# Patient Record
Sex: MALE | Employment: UNEMPLOYED | URBAN - METROPOLITAN AREA
[De-identification: names, ages, dates, MRNs, and addresses within clinical notes are randomized per-mention and may not be internally consistent; named-entity substitution may affect disease eponyms.]

---

## 2024-07-30 ENCOUNTER — APPOINTMENT (OUTPATIENT)
Dept: GENERAL RADIOLOGY | Age: 54
End: 2024-07-30
Payer: COMMERCIAL

## 2024-07-30 ENCOUNTER — HOSPITAL ENCOUNTER (EMERGENCY)
Age: 54
Discharge: HOME | End: 2024-07-30
Attending: GENERAL PRACTICE
Payer: COMMERCIAL

## 2024-07-30 VITALS
DIASTOLIC BLOOD PRESSURE: 78 MMHG | BODY MASS INDEX: 29.55 KG/M2 | WEIGHT: 195 LBS | SYSTOLIC BLOOD PRESSURE: 139 MMHG | HEART RATE: 78 BPM | OXYGEN SATURATION: 97 % | HEIGHT: 68 IN | TEMPERATURE: 98.8 F | RESPIRATION RATE: 21 BRPM

## 2024-07-30 DIAGNOSIS — S42.402A CLOSED FRACTURE OF LEFT ELBOW, INITIAL ENCOUNTER: Primary | ICD-10-CM

## 2024-07-30 DIAGNOSIS — S53.105A CLOSED DISLOCATION OF LEFT ELBOW, INITIAL ENCOUNTER: ICD-10-CM

## 2024-07-30 PROCEDURE — 99152 MOD SED SAME PHYS/QHP 5/>YRS: CPT

## 2024-07-30 PROCEDURE — 24675 CLTX ULNAR FX PROX W/MNPJ: CPT

## 2024-07-30 PROCEDURE — 73070 X-RAY EXAM OF ELBOW: CPT

## 2024-07-30 PROCEDURE — 6360000002 HC RX W HCPCS: Performed by: GENERAL PRACTICE

## 2024-07-30 PROCEDURE — 99285 EMERGENCY DEPT VISIT HI MDM: CPT

## 2024-07-30 RX ORDER — HYDROCODONE BITARTRATE AND ACETAMINOPHEN 5; 325 MG/1; MG/1
1 TABLET ORAL EVERY 6 HOURS PRN
Qty: 10 TABLET | Refills: 0 | Status: SHIPPED | OUTPATIENT
Start: 2024-07-30 | End: 2024-08-02

## 2024-07-30 RX ORDER — FENTANYL CITRATE 50 UG/ML
100 INJECTION, SOLUTION INTRAMUSCULAR; INTRAVENOUS ONCE
Status: COMPLETED | OUTPATIENT
Start: 2024-07-30 | End: 2024-07-30

## 2024-07-30 RX ORDER — PROPOFOL 10 MG/ML
1 INJECTION, EMULSION INTRAVENOUS ONCE
Status: COMPLETED | OUTPATIENT
Start: 2024-07-30 | End: 2024-07-30

## 2024-07-30 RX ADMIN — PROPOFOL 45 MG: 10 INJECTION, EMULSION INTRAVENOUS at 06:20

## 2024-07-30 RX ADMIN — FENTANYL CITRATE 100 MCG: 50 INJECTION INTRAMUSCULAR; INTRAVENOUS at 05:07

## 2024-07-30 ASSESSMENT — PAIN SCALES - GENERAL
PAINLEVEL_OUTOF10: 7
PAINLEVEL_OUTOF10: 0

## 2024-07-30 ASSESSMENT — LIFESTYLE VARIABLES
HOW MANY STANDARD DRINKS CONTAINING ALCOHOL DO YOU HAVE ON A TYPICAL DAY: 3 OR 4
HOW OFTEN DO YOU HAVE A DRINK CONTAINING ALCOHOL: 2-4 TIMES A MONTH
HOW MANY STANDARD DRINKS CONTAINING ALCOHOL DO YOU HAVE ON A TYPICAL DAY: 3 OR 4
HOW OFTEN DO YOU HAVE A DRINK CONTAINING ALCOHOL: 2-4 TIMES A MONTH

## 2024-07-30 ASSESSMENT — PAIN - FUNCTIONAL ASSESSMENT
PAIN_FUNCTIONAL_ASSESSMENT: NONE - DENIES PAIN
PAIN_FUNCTIONAL_ASSESSMENT: NONE - DENIES PAIN

## 2024-07-30 NOTE — ED NOTES
Patient medicated with ordered fentanyl. Patient left arm reduced at bedside by Dr. Youssef. Patient tolerated procedure well. Patient has full sensation, pulses present.

## 2024-07-30 NOTE — ED PROVIDER NOTES
07/30/24.    RADIOLOGY:  XR ELBOW LEFT (2 VIEWS)   Final Result   1. Relocated elbow joint.   2. Small fracture fragment of the coronoid process noted volarly.         XR ELBOW LEFT (2 VIEWS)   Final Result   1. Persistent dislocation of the elbow joint.   2. Small coronoid process fracture fragment.         XR ELBOW LEFT (2 VIEWS)   Final Result   1. Dislocation of the elbow joint.   2. Small fracture fragment at the volar elbow may represent small coronoid   process tip fracture.              EKG  See MDM    All EKG's are interpreted by the Emergency Department Physician who either signs or Co-signs this chart in the absence of a cardiologist.    EMERGENCY DEPARTMENT COURSE/ MDM:  54 y.o. male who presents with deformity of the left elbow.  Differential diagnosis includes but not limited to fracture, dislocation,    X-ray shows dislocation of the elbow along with fracture of a portion of the coronoid process.  100 mcg of fentanyl given to patient, and traction applied with successful reduction.  Placed in sling.  Repeat XR shows that elbow spontaneously dislocated again.  Pt not tolerating traction without sedation.  Pt was then sedated with propofol and elbow reduced again.  Placed in orthoglass splint and then sling    Sedation start time 0619:  Sedation end time 0635           PROCEDURES:  Ortho Injury    Date/Time: 8/3/2024 3:18 AM    Performed by: Juan Youssef DO  Authorized by: Juan Youssef DO  Consent: Verbal consent obtained. Written consent obtained.  Consent given by: patient  Patient identity confirmed: verbally with patient and hospital-assigned identification number  Injury location: elbow  Location details: left elbow  Injury type: dislocation  Dislocation type: anterior  Pre-procedure distal perfusion: normal  Pre-procedure neurological function: normal  Pre-procedure range of motion: reduced    Anesthesia:  Local anesthesia used: no    Sedation:  Patient sedated: yes  Sedatives:

## 2024-07-30 NOTE — ED NOTES
0620-conscious sedation started, ordered propofol given    0625-Xray order in place/ Xray contacted to come do placement confirmation Xray    0635- placement confirmed by Dr. Youssef.     0640-splint completed by dr. Youssef and Dang NAQVI. Sling in place.  Patient alert, oriented x4. VSS.

## 2024-07-31 ENCOUNTER — OFFICE VISIT (OUTPATIENT)
Dept: ORTHOPEDIC SURGERY | Facility: CLINIC | Age: 54
End: 2024-07-31
Payer: COMMERCIAL

## 2024-07-31 ENCOUNTER — HOSPITAL ENCOUNTER (OUTPATIENT)
Dept: RADIOLOGY | Facility: HOSPITAL | Age: 54
Discharge: HOME | End: 2024-07-31
Payer: COMMERCIAL

## 2024-07-31 VITALS — HEIGHT: 68 IN | BODY MASS INDEX: 29.7 KG/M2 | WEIGHT: 196 LBS

## 2024-07-31 DIAGNOSIS — S53.105A DISLOCATION OF LEFT ELBOW, INITIAL ENCOUNTER: ICD-10-CM

## 2024-07-31 DIAGNOSIS — S52.042A DISPLACED FRACTURE OF CORONOID PROCESS OF LEFT ULNA, INITIAL ENCOUNTER FOR CLOSED FRACTURE: ICD-10-CM

## 2024-07-31 PROCEDURE — 99213 OFFICE O/P EST LOW 20 MIN: CPT | Performed by: ORTHOPAEDIC SURGERY

## 2024-07-31 PROCEDURE — 3008F BODY MASS INDEX DOCD: CPT | Performed by: ORTHOPAEDIC SURGERY

## 2024-07-31 PROCEDURE — 73200 CT UPPER EXTREMITY W/O DYE: CPT | Mod: LT

## 2024-07-31 PROCEDURE — 1036F TOBACCO NON-USER: CPT | Performed by: ORTHOPAEDIC SURGERY

## 2024-07-31 PROCEDURE — 99203 OFFICE O/P NEW LOW 30 MIN: CPT | Performed by: ORTHOPAEDIC SURGERY

## 2024-07-31 PROCEDURE — 73200 CT UPPER EXTREMITY W/O DYE: CPT | Mod: LEFT SIDE | Performed by: RADIOLOGY

## 2024-07-31 PROCEDURE — 24675 CLTX ULNAR FX PROX W/MNPJ: CPT | Performed by: ORTHOPAEDIC SURGERY

## 2024-07-31 ASSESSMENT — PATIENT HEALTH QUESTIONNAIRE - PHQ9
1. LITTLE INTEREST OR PLEASURE IN DOING THINGS: NOT AT ALL
2. FEELING DOWN, DEPRESSED OR HOPELESS: NOT AT ALL
SUM OF ALL RESPONSES TO PHQ9 QUESTIONS 1 AND 2: 0

## 2024-07-31 NOTE — PROGRESS NOTES
History: Ty is here for his left arm.  He fell awkwardly landing on the left side.  He was noted to have an elbow dislocation.  It was subsequently reduced in the ER.  He is here today as a walk-in patient.  He is right-hand dominant.    Past medical history: Multiple  Medications: Multiple  Allergies: No known drug allergies    Please refer to the intake H&P regarding the patient's review of systems, family history and social history as was done today    HEENT: Normal  Lungs: Clear to auscultation  Heart: RRR  Abdomen: Soft, nontender  Skin: clear  Extremity: Splint is clear.  No redness or irritation.  He has pain with any attempted elbow motion.  He can wiggle the fingers well.  Mild swelling down into the digits with minimal numbness or tingling.  No evidence of other extremity trauma.  Contralateral exam is normal for strength, motion, stability and neurovascular assessment.    Radiographs: X-rays from [facility] show a reduced elbow joint with a small coronoid fragment anteriorly.    Assessment: Left elbow dislocation with coronoid fracture    Plan: We discussed the nature of elbow dislocations.  His elbow appears well reduced at this time.  I would recommend a CT scan to better assess for fracture pattern.  If this fragment is small we will hopefully be able to treat this nonoperatively with immobilization followed by bracing and therapy.  We did discuss the high risk of potential stiffness with these injuries as well as the risk of recurrent instability if there is not adequate healing.  He would like to stick with over-the-counter medicine for pain and will ice aggressively.  A new splint was applied today.  We will see him back next week after his CT scan is obtained.  We could consider bracing or continue with splint immobilization for the first 2 to 3 weeks until bracing is safe.  All questions were answered today with the patient.    This note was generated with voice recognition software and may  contain grammatical errors.

## 2024-08-02 ENCOUNTER — TELEPHONE (OUTPATIENT)
Dept: ORTHOPEDIC SURGERY | Facility: CLINIC | Age: 54
End: 2024-08-02
Payer: COMMERCIAL

## 2024-08-02 NOTE — TELEPHONE ENCOUNTER
Called patient to let him know cost of the T-Scope Elbow: $876.00. Did advise patient it can possibly be cheaper and if patient has a HSA account that will be an accepted payment for their insurance

## 2024-08-05 ENCOUNTER — OFFICE VISIT (OUTPATIENT)
Dept: ORTHOPEDIC SURGERY | Facility: CLINIC | Age: 54
End: 2024-08-05
Payer: COMMERCIAL

## 2024-08-05 DIAGNOSIS — S53.105A DISLOCATION OF LEFT ELBOW, INITIAL ENCOUNTER: Primary | ICD-10-CM

## 2024-08-05 DIAGNOSIS — M10.9 ACUTE GOUT OF RIGHT ELBOW, UNSPECIFIED CAUSE: ICD-10-CM

## 2024-08-05 PROCEDURE — 99214 OFFICE O/P EST MOD 30 MIN: CPT | Performed by: ORTHOPAEDIC SURGERY

## 2024-08-05 PROCEDURE — 1036F TOBACCO NON-USER: CPT | Performed by: ORTHOPAEDIC SURGERY

## 2024-08-05 PROCEDURE — L3761 EO, ADJ LOCK JOINT PREFAB OT: HCPCS | Performed by: ORTHOPAEDIC SURGERY

## 2024-08-05 PROCEDURE — 99024 POSTOP FOLLOW-UP VISIT: CPT | Performed by: ORTHOPAEDIC SURGERY

## 2024-08-05 RX ORDER — COLCHICINE 0.6 MG/1
TABLET ORAL
Qty: 6 TABLET | Refills: 0 | Status: SHIPPED | OUTPATIENT
Start: 2024-08-05

## 2024-08-05 NOTE — PROGRESS NOTES
History: Ty is here for his left elbow.  He is 1 week from an elbow dislocation that required reduction.  Is noted to have a small coronoid fragment on x-ray.  He is also having a bit of a gout attack in his right elbow.  He is been using his splint and sling on the left.    Past medical history: Multiple  Medications: Multiple  Allergies: No known drug allergies    Please refer to the intake H&P regarding the patient's review of systems, family history and social history as was done today    HEENT: Normal  Lungs: Clear to auscultation  Heart: RRR  Abdomen: Soft, nontender  Skin: clear  Extremity: He has mild swelling of the left elbow today.  Mild pain medially and laterally.  We can range him from 50 to 90 degrees without much pain.  Slight pain with supination maneuvers but good  strength.  No numbness.  Right elbow shows swelling in the olecranon bursa without redness or streaking proximal.  He has full elbow motion on the right.  Contralateral exam is normal for strength, motion, stability and neurovascular assessment.    Radiographs: CAT scan of left elbow shows a small fragmentation of the coronoid fracture with otherwise good alignment of the joint.    Assessment: Stable left elbow dislocation with small coronoid fracture, right elbow gout    Plan: His left elbow fracture pattern appears stable.  At this point we are going allow for some healing in a locked brace at 90 degrees.  He understands need to wear the brace at all times and can take it off only to shower.  He will not extend the elbow at all.  We will see him in 10 to 14 days for recheck with two-view elbow x-rays.  If doing well we can consider starting some gentle range of motion.  He was also given a position for colchicine for his gout attack on the right elbow as he has taken this in the past.  He is going to follow-up with his PCP regarding chronic gout treatment if needed.  All questions were answered today with the  patient.    This note was generated with voice recognition software and may contain grammatical errors.

## 2024-08-19 ENCOUNTER — OFFICE VISIT (OUTPATIENT)
Dept: ORTHOPEDIC SURGERY | Facility: CLINIC | Age: 54
End: 2024-08-19
Payer: COMMERCIAL

## 2024-08-19 ENCOUNTER — HOSPITAL ENCOUNTER (OUTPATIENT)
Dept: RADIOLOGY | Facility: CLINIC | Age: 54
Discharge: HOME | End: 2024-08-19
Payer: COMMERCIAL

## 2024-08-19 DIAGNOSIS — S53.105A DISLOCATION OF LEFT ELBOW, INITIAL ENCOUNTER: ICD-10-CM

## 2024-08-19 PROCEDURE — 1036F TOBACCO NON-USER: CPT | Performed by: ORTHOPAEDIC SURGERY

## 2024-08-19 PROCEDURE — 99213 OFFICE O/P EST LOW 20 MIN: CPT | Performed by: ORTHOPAEDIC SURGERY

## 2024-08-19 PROCEDURE — 73080 X-RAY EXAM OF ELBOW: CPT | Mod: LT

## 2024-08-19 PROCEDURE — 73080 X-RAY EXAM OF ELBOW: CPT | Mod: LEFT SIDE | Performed by: ORTHOPAEDIC SURGERY

## 2024-08-19 PROCEDURE — 99024 POSTOP FOLLOW-UP VISIT: CPT | Performed by: ORTHOPAEDIC SURGERY

## 2024-08-19 NOTE — PROGRESS NOTES
History: Ty is here for his left elbow.  He is 3 weeks from a left elbow dislocation and small coronoid fracture.  He does feel his pain is getting better but he still has soreness and achiness at times.  He has been wearing his brace locked at 90 degrees.    Past medical history: Multiple  Medications: Multiple  Allergies: No known drug allergies    Please refer to the intake H&P regarding the patient's review of systems, family history and social history as was done today    HEENT: Normal  Lungs: Clear to auscultation  Heart: RRR  Abdomen: Soft, nontender  Skin: clear  Extremity: He has mild swelling and ecchymosis normal for this stage healing.  Motion today is from 40 to 90 degrees with fairly mild pain.  He does have more pain with supination.  He can make a full fist.  No numbness or tingling.  Contralateral exam is normal for strength, motion, stability and neurovascular assessment.    Radiographs: AP lateral bleak views of the left elbow show stable alignment of the elbow joint.  Small coronoid fragment again present.    Assessment: Healing left elbow dislocation with coronoid fracture    Plan: Overall he is doing well.  We again discussed the high risk of stiffness with these injuries.  We did open his brace from 30 to 100 degrees and he is ready to start formal therapy.  He can work on range of motion within this restriction but also increase 10 degrees of flexion and 10 degrees of extension each week both in therapy and in his brace.  We will see him back in another 4 weeks to assess progress with two-view elbow x-rays at that time.  If doing well we can begin to allow for some activity out of the brace.  All questions were answered today with the patient.    This note was generated with voice recognition software and may contain grammatical errors.

## 2024-08-21 ENCOUNTER — TELEPHONE (OUTPATIENT)
Dept: ORTHOPEDIC SURGERY | Facility: CLINIC | Age: 54
End: 2024-08-21
Payer: COMMERCIAL

## 2024-08-22 NOTE — TELEPHONE ENCOUNTER
Called to notify patient brace is no longer showing $876. Now $175.20 since hitting Individual deductible for the 80/20 to take effect

## 2024-08-23 ENCOUNTER — EVALUATION (OUTPATIENT)
Dept: PHYSICAL THERAPY | Facility: CLINIC | Age: 54
End: 2024-08-23
Payer: COMMERCIAL

## 2024-08-23 DIAGNOSIS — S53.105A DISLOCATION OF LEFT ELBOW, INITIAL ENCOUNTER: ICD-10-CM

## 2024-08-23 DIAGNOSIS — M25.622 ELBOW STIFFNESS, LEFT: ICD-10-CM

## 2024-08-23 DIAGNOSIS — S53.105D DISLOCATION OF LEFT ELBOW, SUBSEQUENT ENCOUNTER: Primary | ICD-10-CM

## 2024-08-23 PROCEDURE — 97110 THERAPEUTIC EXERCISES: CPT | Performed by: PHYSICAL THERAPIST

## 2024-08-23 PROCEDURE — 97161 PT EVAL LOW COMPLEX 20 MIN: CPT | Performed by: PHYSICAL THERAPIST

## 2024-08-23 PROCEDURE — 97140 MANUAL THERAPY 1/> REGIONS: CPT | Performed by: PHYSICAL THERAPIST

## 2024-08-23 ASSESSMENT — PATIENT HEALTH QUESTIONNAIRE - PHQ9
SUM OF ALL RESPONSES TO PHQ9 QUESTIONS 1 AND 2: 2
2. FEELING DOWN, DEPRESSED OR HOPELESS: SEVERAL DAYS
10. IF YOU CHECKED OFF ANY PROBLEMS, HOW DIFFICULT HAVE THESE PROBLEMS MADE IT FOR YOU TO DO YOUR WORK, TAKE CARE OF THINGS AT HOME, OR GET ALONG WITH OTHER PEOPLE: NOT DIFFICULT AT ALL
1. LITTLE INTEREST OR PLEASURE IN DOING THINGS: SEVERAL DAYS

## 2024-08-23 ASSESSMENT — PAIN DESCRIPTION - DESCRIPTORS: DESCRIPTORS: TIGHTNESS

## 2024-08-23 ASSESSMENT — ENCOUNTER SYMPTOMS
DEPRESSION: 1
LOSS OF SENSATION IN FEET: 0
OCCASIONAL FEELINGS OF UNSTEADINESS: 0

## 2024-08-23 ASSESSMENT — PAIN SCALES - GENERAL: PAINLEVEL_OUTOF10: 1

## 2024-08-23 NOTE — PROGRESS NOTES
"Physical Therapy    Physical Therapy Treatment    Patient Name: Ty Piedra  MRN: [id number]  Today's Date: 8/23/2024    Current Problem  1. Dislocation of left elbow, subsequent encounter  Referral to Physical Therapy    Follow Up In Physical Therapy      2. Elbow stiffness, left          Insurance: [employer]  Allowed visits: BMN  Allowed visits: 2    Subjective  HPI: Patient fell and fractured and dislocated his left elbow when he tripped and fell on sidewalk. This occurred 8/1/24. He presented to ER and had his elbow reduced and notes \"they just wrapped it\". He was given a sling and an immobilizer by referring physician and has been compliant with this. He is right hand dominant. Chief complaint currently is \"my left side is always cramping up because I'm compensating\". He is having difficulty sleeping. He denies any paresthesias into his left UE. Biggest limitation is showering. Exacerbating factors include \"any kind of pressure on it\". Relieving factors include \"taking this thing off\" referring to immobilizer. Medications include use of Ibuprofen but more so for right sided pain. He was 100% functional prior to injury. PMH is negative for pathology  Referring physician: Vinod Crystal MD - F/U in 3 weeks.     Living environment: lives with wife  Work: retired     Patient-specific goal: \"I don't know, to hopefully get this thing back\".     Objective  Worst pain in the last 24 hours, 6/10    Precautions: brace at  degrees, allowed to open it 10 degrees each way per week. ROM only.     Relevant imaging/diagnostic testing results: AP lateral oblique views 8/19/24 of the left elbow show stable alignment of the elbow joint. Fragmentation around the coronoid appears stable compared to prior imaging. No evidence of acute displacement.    Observation: some atrophy of left forearm musculature evident; posture WFL    AROM  Left elbow flexion: 102 degrees  Left elbow extension: 38 degrees P!   Left elbow " "supination: 40 degrees P!   Left elbow pronation: 61 degrees p!   Left wrist extension: 40 degrees P!   Left wrist flexion: 47 degrees p!   Left wrist UD: 38 degrees  Left wrist RD: 12 degrees   Left shoulder AROM WFL    MMT  Deferred    Mobility: normal mobility of radial ulnar joint present both proximally and distally.     Quick DASH: 48%    Assessment  53 yo male with 3 week history of present condition and presence of zero personal factors and/or comorbidities that impact the plan of care presents with left elbow pain, decreased AROM, and decreased tolerance to ADLs effecting the following body structures and functions: left UE body region and musculoskeletal body system including the left elbow. Activity limitations and participation restrictions include decreased tolerance to reaching, lifting, pushing, and pulling tasks with left UE. Ty will therefore benefit from PT management to establish a HEP and promote safe healing and ROM progression. The clinical presentation of this patient is evolving and their history and examination findings are consistent with a moderate complexity evaluation. Good potential.    Treatment provided today: Initial evaluation completed. Discussed objective findings and goals of skilled care. PROM left elbow supination and pronation; gentle radial ulnar oscillations per patient tolerance. Instructed patient in therapeutic exercise and HEP with handout outlining specific parameters provided (supination/pronation AROM x20, extension/flexion AROM x20, wrist extension stretch 20\"x3, scapular retraction 5\"x10). Opened brace 10 degrees each direction in sagittal plane as per guidelines Agreed upon POC and answered all questions.    29088 - 18 minutes, 1 unit untimed  01163 - 15 minutes, 1 unit  54986 - 8 minutes, 1 unit    Plan  Recommending PT management 1x/week for 4-6 weeks, 4-6 visits.     Goals  Independent with HEP to expedite progress and promote goal achievement.   Decrease " DASH to < or equal to 20% disabled for increased functional ability.  Increase AROM left elbow to > or equal to 0 - 140 degrees, supination and pronation to > or equal to 80 dgrees, and wrist extension and extension to > or equal 60 degrees for ease with reaching to face and performing ADLs.  Decrease pain at worst to < or equal to 2/10 for improved QOL and ability to sleep through night.   Strength goal deferred.

## 2024-08-26 ENCOUNTER — APPOINTMENT (OUTPATIENT)
Dept: PHYSICAL THERAPY | Facility: HOSPITAL | Age: 54
End: 2024-08-26
Payer: COMMERCIAL

## 2024-08-28 ENCOUNTER — APPOINTMENT (OUTPATIENT)
Dept: PHYSICAL THERAPY | Facility: CLINIC | Age: 54
End: 2024-08-28
Payer: COMMERCIAL

## 2024-08-28 DIAGNOSIS — S53.105D DISLOCATION OF LEFT ELBOW, SUBSEQUENT ENCOUNTER: Primary | ICD-10-CM

## 2024-08-28 DIAGNOSIS — M25.622 ELBOW STIFFNESS, LEFT: ICD-10-CM

## 2024-08-28 PROCEDURE — 97110 THERAPEUTIC EXERCISES: CPT | Performed by: PHYSICAL THERAPIST

## 2024-08-28 PROCEDURE — 97140 MANUAL THERAPY 1/> REGIONS: CPT | Performed by: PHYSICAL THERAPIST

## 2024-08-28 ASSESSMENT — PAIN SCALES - GENERAL: PAINLEVEL_OUTOF10: 3

## 2024-08-28 NOTE — PROGRESS NOTES
"Physical Therapy    Physical Therapy Treatment    Patient Name: Ty Piedra  MRN: [id number]  Today's Date: 8/28/2024    Current Problem  1. Dislocation of left elbow, subsequent encounter  Follow Up In Physical Therapy      2. Elbow stiffness, left          General  Reason for Referral: (P) S/p left elbow fracture and dislocation  Referred By: (P) Vinod Crystal MD    Insurance: [employer]  Allowed visits: BMN  Visit number: 2    Subjective  Patient with difficulty sleeping due to use of brace though he has been compliant with wearing it. He reports good tolerance to last workout. He will see MD for F/U 9/18/24.     Objective  Reviewed and progressed marked therapeutic exercise per patient tolerance (15917 - 30 minutes, 2 units) *Supine elbow flexion 20\"x3  *Wand extension stretch 20\"x3  Scapular retraction 5\"x15  *Seated supination stretch 20\"x3  *Seated pronation stretch 20\"x3  Supination/pronation AROM x20   Elbow flexion and extension AROM x20  Wrist extension stretch 20\"x3  *Wrist flexion stretch 20\"x3    *added to HEP    MT (18997 - 10 minutes, 1 unit) PROM left elbow flexion, extension, supination, and pronation with end range hold per patient tolerance. Gentle radial-ulnar oscillation for mobility.      Assessment  Patient tolerated workout well with some improvement in ROM. Most discomfort occurs with supination. Encouraged continued compliance with HEP as well as use of brace; instructed him to open brace 10 degrees each direction per guidelines 8/30/24.     Plan  Continue per POC at this time.    "

## 2024-09-10 ENCOUNTER — APPOINTMENT (OUTPATIENT)
Dept: PHYSICAL THERAPY | Facility: CLINIC | Age: 54
End: 2024-09-10
Payer: COMMERCIAL

## 2024-09-17 ENCOUNTER — APPOINTMENT (OUTPATIENT)
Dept: PHYSICAL THERAPY | Facility: CLINIC | Age: 54
End: 2024-09-17
Payer: COMMERCIAL

## 2024-09-17 DIAGNOSIS — M25.622 ELBOW STIFFNESS, LEFT: ICD-10-CM

## 2024-09-17 DIAGNOSIS — S53.105D DISLOCATION OF LEFT ELBOW, SUBSEQUENT ENCOUNTER: Primary | ICD-10-CM

## 2024-09-17 PROCEDURE — 97110 THERAPEUTIC EXERCISES: CPT | Performed by: PHYSICAL THERAPIST

## 2024-09-17 PROCEDURE — 97140 MANUAL THERAPY 1/> REGIONS: CPT | Performed by: PHYSICAL THERAPIST

## 2024-09-17 ASSESSMENT — PAIN SCALES - GENERAL: PAINLEVEL_OUTOF10: 3

## 2024-09-17 NOTE — PROGRESS NOTES
"Physical Therapy    Physical Therapy Treatment    Patient Name: Ty Piedra  MRN: [id number]  Today's Date: 9/17/2024    Current Problem  1. Dislocation of left elbow, subsequent encounter  Follow Up In Physical Therapy      2. Elbow stiffness, left          General  Reason for Referral: (P) S/p left elbow fracture and dislocation  Referred By: (P) Vinod Crystal MD    Insurance: [employer]  Allowed visits: BMN  Visit number: 3    Subjective  Patient with no new complaints today. He did open up his brace as instructed 8/30/24. He recently had COVID so has not been seen clinically since 8/28/24. He has been compliant with his HEP. He will see Dr. Crystal for a F/U tomorrow.     Objective  Reviewed and progressed marked therapeutic exercise per patient tolerance (68474 - 28 minutes, 2 units) Pulleys 2'/2'  Supine elbow flexion 30\"x3  Supine punches 2x10  Wand extension stretch 30\"x3  Scapular retraction 5\"x15  Seated supination stretch 30\"x3  Seated pronation stretch 30\"x3  Supination/pronation AROM x20   Wrist extension stretch 30\"x3  Wrist flexion stretch 30\"x3     *added to HEP     MT (75901 - 12 minutes, 1 unit) PROM left elbow flexion, extension, supination, and pronation with end range hold per patient tolerance. Gentle radial-ulnar oscillation for mobility.      Assessment  Patient tolerated workout well but discomfort persists with end range ROM. He is most limited in transverse plane. Encouraged continued brace wear until cleared by surgeon as well as continued compliance with HEP.     Plan  Continue per POC at this time.    "

## 2024-09-18 ENCOUNTER — HOSPITAL ENCOUNTER (OUTPATIENT)
Dept: RADIOLOGY | Facility: CLINIC | Age: 54
Discharge: HOME | End: 2024-09-18
Payer: COMMERCIAL

## 2024-09-18 ENCOUNTER — OFFICE VISIT (OUTPATIENT)
Dept: ORTHOPEDIC SURGERY | Facility: CLINIC | Age: 54
End: 2024-09-18
Payer: COMMERCIAL

## 2024-09-18 DIAGNOSIS — S52.042A DISPLACED FRACTURE OF CORONOID PROCESS OF LEFT ULNA, INITIAL ENCOUNTER FOR CLOSED FRACTURE: ICD-10-CM

## 2024-09-18 DIAGNOSIS — S53.105A DISLOCATION OF LEFT ELBOW, INITIAL ENCOUNTER: ICD-10-CM

## 2024-09-18 PROCEDURE — 99213 OFFICE O/P EST LOW 20 MIN: CPT | Performed by: ORTHOPAEDIC SURGERY

## 2024-09-18 PROCEDURE — 73070 X-RAY EXAM OF ELBOW: CPT | Mod: LT

## 2024-09-18 PROCEDURE — 1036F TOBACCO NON-USER: CPT | Performed by: ORTHOPAEDIC SURGERY

## 2024-09-18 PROCEDURE — 73070 X-RAY EXAM OF ELBOW: CPT | Mod: LEFT SIDE | Performed by: ORTHOPAEDIC SURGERY

## 2024-09-18 PROCEDURE — 99024 POSTOP FOLLOW-UP VISIT: CPT | Performed by: ORTHOPAEDIC SURGERY

## 2024-09-18 NOTE — PROGRESS NOTES
History: Ty is here for his left arm.  He is 7 weeks out from an elbow dislocation and coronoid fracture.  He has been using a T scope brace and going to therapy.  His pain is improving and overall he is doing well.    Past medical history: Multiple  Medications: Multiple  Allergies: No known drug allergies    Please refer to the intake H&P regarding the patient's review of systems, family history and social history as was done today    HEENT: Normal  Lungs: Clear to auscultation  Heart: RRR  Abdomen: Soft, nontender  Skin: clear  Extremity: His skin is clear throughout.  No redness or swelling.  He lacks about 5 degrees of supination on the left compared to the right.  Elbow range of motion is from 5 to 100 degrees.  He has good wrist flexion and extension with decent  strength.  He denies any numbness or tingling.  Contralateral exam is normal for strength, motion, stability and neurovascular assessment.    Radiographs: Elbow x-rays show stable alignment to the elbow joint with interval healing to a small coronoid fracture.  There is heterotopic ossification present anteriorly.    Assessment: Healing left elbow dislocation with coronoid fracture    Plan: His brace is already opened up to full range of motion.  We discussed continuing to wean from the brace.  He is going to continue with his outpatient therapy and can do full range of motion.  He can do some gentle strengthening as well.  I would still recommend he avoid any lifting more than 1 to 2 pounds.  We will see him back in another 5 to 6 weeks to assess progress with 2 view elbow x-rays.  If doing well at that time he can release full cavity.  All questions were answered today with the patient.    For complete plan and/or surgical details, please refer to Dr. Crystal's portion of this split/shared dictation.     Marguerite Simon PA-C    In a face-to-face encounter today, I Vinod Crystal MD performed a history and physical examination,  discussed pertinent diagnostic studies if indicated, and discussed diagnosis and management strategies with both the patient and the midlevel provider.  I reviewed the midlevel's note and agree with the documented findings and plan of care.  Greater than 50% of the evaluation and treatment decision was performed by the physician myself during today's visit.    Ty is doing well and continues to slowly improve.  His extension is coming along nicely and he is still working on his flexion.  He continues to form new bone on x-ray and we will need to monitor for heterotopic ossification.  He can slowly begin to wean from the brace over the next few weeks but avoid any heavy lifting.  He is going to follow-up in another 5 to 6 weeks to assess progress with his therapy again focusing more on flexion at this point.  We can continue to add some gentle strengthening over time.  We will obtain two-view elbow x-rays at follow-up.      This note was generated with voice recognition software and may contain grammatical errors.

## 2024-09-24 ENCOUNTER — APPOINTMENT (OUTPATIENT)
Dept: PHYSICAL THERAPY | Facility: CLINIC | Age: 54
End: 2024-09-24
Payer: COMMERCIAL

## 2024-09-24 DIAGNOSIS — S53.105D DISLOCATION OF LEFT ELBOW, SUBSEQUENT ENCOUNTER: ICD-10-CM

## 2024-09-24 PROCEDURE — 97140 MANUAL THERAPY 1/> REGIONS: CPT | Performed by: PHYSICAL THERAPIST

## 2024-09-24 PROCEDURE — 97110 THERAPEUTIC EXERCISES: CPT | Performed by: PHYSICAL THERAPIST

## 2024-09-24 ASSESSMENT — PAIN - FUNCTIONAL ASSESSMENT: PAIN_FUNCTIONAL_ASSESSMENT: 0-10

## 2024-09-24 ASSESSMENT — PAIN SCALES - GENERAL: PAINLEVEL_OUTOF10: 4

## 2024-09-24 NOTE — PROGRESS NOTES
"Physical Therapy    Physical Therapy Treatment    Patient Name: Ty Piedra  MRN: [id number]  Today's Date: 9/24/2024    Time Entry:   Time Calculation  Start Time: 0915  Stop Time: 1000  Time Calculation (min): 45 min     PT Therapeutic Procedures Time Entry  Manual Therapy Time Entry: 20  Therapeutic Exercise Time Entry: 25                 Insurance: [employer] [place]  Visit: 5    Assessment:   Patient tolerated the addition of the arm bike and resisted rows and shoulder extension. Pt is still limited in flexion and extension but continued to work on both motions. We continued to encourage exercises at home and adding the new exercises from today's session.     Plan:   Continue with plan of care and progressing towards established rehab goals.       Current Problem  1. Dislocation of left elbow, subsequent encounter  Follow Up In Physical Therapy          General  PT  Visit  PT Received On: 09/24/24  General  Reason for Referral: S/p L elbow fracture and dislocation  Referred By: Dr. Vinod Crystal    Subjective    Pt came in today with complaints of his elbow hurting, around a 4/10. Pt was not wearing his brace due to Dr. Rebolledo saying he may wean from the brace. Pt did wear brace to a concert this weekend. Pt has a follow up with Dr. Crystal next month.     Pain  Pain Assessment  Pain Assessment: 0-10  0-10 (Numeric) Pain Score: 4  Pain Location: Elbow  Pain Orientation: Left    Objective   Precautions: From follow up with Dr. Crystal on 9/18 : \"can do full range of motion. He can do some gentle strengthening as well. I would still recommend he avoid any lifting more than 1 to 2 pounds.\"       Treatments:  Therapeutic Exercise  Therapeutic Exercise Performed: Yes  Therapeutic Exercise Activity 1: Arm Bike, level 1, 2 min/2min  Therapeutic Exercise Activity 2: Weighted elbow extension 2lb, 2min  Therapeutic Exercise Activity 3: Weighted punches 2x10  Therapeutic Exercise Activity 4: wrist flexion/extension " "stretch 2 x 30 second hold each  Therapeutic Exercise Activity 5: RTB row 2x10  Therapeutic Exercise Activity 6: RTB shoulder extension 2x10    Manual Therapy  Manual Therapy Performed: Yes  Manual Therapy Activity 1: Anterior radial head glide grade 3  Manual Therapy Activity 2: \"Scoop\" technique for flexion grade 3  Manual Therapy Activity 3: Pronation and supination passive stretch 3 x 30 sec hold each  Manual Therapy Activity 4: Elbow flexion passive stretch 3 x 30 sec hold  "

## 2024-10-01 ENCOUNTER — APPOINTMENT (OUTPATIENT)
Dept: PHYSICAL THERAPY | Facility: CLINIC | Age: 54
End: 2024-10-01
Payer: COMMERCIAL

## 2024-10-01 DIAGNOSIS — S53.105D DISLOCATION OF LEFT ELBOW, SUBSEQUENT ENCOUNTER: Primary | ICD-10-CM

## 2024-10-01 DIAGNOSIS — M25.622 ELBOW STIFFNESS, LEFT: ICD-10-CM

## 2024-10-01 PROCEDURE — 97140 MANUAL THERAPY 1/> REGIONS: CPT | Performed by: PHYSICAL THERAPIST

## 2024-10-01 PROCEDURE — 97110 THERAPEUTIC EXERCISES: CPT | Performed by: PHYSICAL THERAPIST

## 2024-10-01 ASSESSMENT — PAIN SCALES - GENERAL: PAINLEVEL_OUTOF10: 2

## 2024-10-01 NOTE — PROGRESS NOTES
"Physical Therapy    Physical Therapy Treatment    Patient Name: Ty Piedra  MRN: [id number]  Today's Date: 10/1/2024    Current Problem  1. Dislocation of left elbow, subsequent encounter        2. Elbow stiffness, left          General  Reason for Referral: (P) S/p L elbow fracture and dislocation  Referred By: (P) Dr. Vinod Crystal    Insurance: [employer]  Allowed visits: BMN  Visit number: 5    Subjective  Patient with some soreness persisting in his left elbow. He also has noticed some pain in his left shoulder area but denies that this limits him. He was sore for about a day following last session. He has been compliant with his HEP. He does admit to not abiding by 2 lb weight restriction. He has difficulty using a Q-tip in his ear.     Objective  Precautions: no weight > 2 lbs     Reviewed and progressed marked therapeutic exercise per patient tolerance (87709 - 31 minutes, 2 units) Pulleys 2'/2'  Supine elbow extension with wand 30\"x3  Weighted elbow extension 3 lbs 90\"  Supine elbow flexion 30\"x3  Supine punches 2 lbs 2x10  Theraband rows RTB 2x10  Theraband extension RTB 2x10  *Theraband triceps PD 2x10  *Table elbow extension stretch 30\"x3  Seated supination stretch 30\"x3  Seated pronation stretch 30\"x3     *added to HEP     MT (75665 - 14 minutes, 1 unit) PROM left elbow flexion, extension, supination, and pronation with end range hold per patient tolerance. Grade II-III radial head and ulnar glides for mobility.     Assessment  Patient tolerated workout fairly though pain elicited with end range motions. He does feel as though his mobility has improved. Encouraged continued compliance with HEP. Formal re-evaluation scheduled for next visit.     Plan  Continue per POC at this time.    "
week(s)

## 2024-10-07 NOTE — PROGRESS NOTES
"Physical Therapy    Physical Therapy Re-Evaluation    Patient Name: Ty Piedra  MRN: [id number]  Today's Date: 10/8/2024    Current Problem  1. Dislocation of left elbow, subsequent encounter        2. Elbow stiffness, left        3. Dislocation of left elbow, initial encounter          General  Reason for Referral: S/p L elbow fracture and dislocation  Referred By: Dr. Vinod Crystal    Insurance: [employer]  Allowed visits: BMN  Visit number: 6    Subjective  Patient with some improvement in his elbow overall. He does have some pain with sleeping on it and notes \"I think I sleep on it a lot\". He does not get awoken at night with pain. Chief compliant currently is \"it comes and goes but sometimes it hurts up here\" referring to his shoulder. Biggest limitation is \"straightening it out\" as well as bending to reach his head. He denies any paresthesias into his hand but feels a little sensation change at his elbow.     Objective  Worst pain in the last 24 hours, 3 improved from 6/10    Precautions: no weight > 2 lbs      Observation: some atrophy of left forearm musculature evident; posture WFL     AROM  Left elbow flexion: 134, improved from 102 degrees  Left elbow extension: -17, improved from -38 degrees P!   Left elbow supination: 62, improved from 40 degrees P!   Left elbow pronation: 78, improved from 61 degrees p!   Left wrist extension: 44, improved from 40 degrees P!   Left wrist flexion: 65, improved from 47 degrees p!   Some limitation and pain with active shoulder abduction  Left HBB: L3 P!      MMT  Left biceps: 4+  Left brachioradialis: 4  Left triceps: 4  Left shoulder abduction: 5  Left  level 2: TBA     Mobility: normal mobility of radial ulnar joint present both proximally and distally.      Quick DASH: 48% at IE    Re-evaluation performed on this date with new objective measurements obtained as above. See updated goals below.     Reviewed and progressed marked therapeutic exercise per " "patient tolerance (26680 - 33 minutes, 2 units) Pulleys 2'/2'  Supine elbow extension with wand 90\"  Weighted elbow extension 3 lbs 2'  Supine elbow flexion 30\"x3  Supine punches 2 lbs 2x10  Theraband rows GTB 2x10  Theraband extension GTB 2x10  Theraband triceps PD 2x10  Table elbow extension stretch 30\"x3     MT (61786 - 12 minutes, 1 unit) PROM left elbow flexion and extension with end range hold per patient tolerance. Grade II-III radial head and ulnar glides for mobility.     Assessment  Patient with improvement in left elbow AROM and overall function and pain level. He will benefit from continued skilled care to progress strength and continue to work on end range ROM. Good potential.     Plan  Recommending continued PT management 1x/week for 4 weeks, 4 visits    Goals  Independent with HEP to expedite progress and promote goal achievement - partially met   Decrease DASH to < or equal to 20% disabled for increased functional ability - not assessed today  Increase AROM left elbow to > or equal to 0 - 140 degrees, supination and pronation to > or equal to 80 dgrees, and wrist extension and extension to > or equal 60 degrees for ease with reaching to face and performing ADLs - partially met  Decrease pain at worst to < or equal to 2/10 for improved QOL and ability to sleep through night - partially met  Increase strength left biceps, brachioradialis, and triceps to 5/5 without pain for ease with lifting and stabilizing with left UE - new goal     "

## 2024-10-08 ENCOUNTER — APPOINTMENT (OUTPATIENT)
Dept: PHYSICAL THERAPY | Facility: CLINIC | Age: 54
End: 2024-10-08
Payer: COMMERCIAL

## 2024-10-08 DIAGNOSIS — S53.105A DISLOCATION OF LEFT ELBOW, INITIAL ENCOUNTER: ICD-10-CM

## 2024-10-08 DIAGNOSIS — S53.105D DISLOCATION OF LEFT ELBOW, SUBSEQUENT ENCOUNTER: Primary | ICD-10-CM

## 2024-10-08 DIAGNOSIS — M25.622 ELBOW STIFFNESS, LEFT: ICD-10-CM

## 2024-10-08 PROCEDURE — 97140 MANUAL THERAPY 1/> REGIONS: CPT | Performed by: PHYSICAL THERAPIST

## 2024-10-08 PROCEDURE — 97110 THERAPEUTIC EXERCISES: CPT | Performed by: PHYSICAL THERAPIST

## 2024-10-08 ASSESSMENT — PAIN SCALES - GENERAL: PAINLEVEL_OUTOF10: 2

## 2024-10-16 ENCOUNTER — APPOINTMENT (OUTPATIENT)
Dept: PHYSICAL THERAPY | Facility: CLINIC | Age: 54
End: 2024-10-16
Payer: COMMERCIAL

## 2024-10-16 DIAGNOSIS — S53.105D DISLOCATION OF LEFT ELBOW, SUBSEQUENT ENCOUNTER: ICD-10-CM

## 2024-10-16 DIAGNOSIS — M25.622 ELBOW STIFFNESS, LEFT: Primary | ICD-10-CM

## 2024-10-16 PROCEDURE — 97140 MANUAL THERAPY 1/> REGIONS: CPT | Performed by: PHYSICAL THERAPIST

## 2024-10-16 PROCEDURE — 97110 THERAPEUTIC EXERCISES: CPT | Performed by: PHYSICAL THERAPIST

## 2024-10-16 ASSESSMENT — PAIN - FUNCTIONAL ASSESSMENT: PAIN_FUNCTIONAL_ASSESSMENT: 0-10

## 2024-10-16 ASSESSMENT — PAIN SCALES - GENERAL: PAINLEVEL_OUTOF10: 2

## 2024-10-16 NOTE — PROGRESS NOTES
"Physical Therapy    Physical Therapy Treatment    Patient Name: Ty Piedra  MRN: [id number]  Today's Date: 10/16/2024    Time Entry:   Time Calculation  Start Time: 0827  Stop Time: 0906  Time Calculation (min): 39 min     PT Therapeutic Procedures Time Entry  Manual Therapy Time Entry: 12  Therapeutic Exercise Time Entry: 27    Insurance: [employer] [place]  Visit: 8     Assessment:   Today Ty came in with some complaints in his L shoulder. Today we added wand shoulder abduction and ER to stretch his L shoulder. He tolerated the addition on these exercises to his HEP, and was given a handout. He did have some elbow pain with triceps pull downs towards end of session. He did admit to lifting more than 2 lbs with home activities.     Plan:   Continue with plan of care and progressing towards established rehab goals.     Current Problem  1. Dislocation of left elbow, subsequent encounter        2. Elbow stiffness, left            General  PT  Visit  PT Received On: 10/16/24  General  Reason for Referral: S/p L elbow fracture and dislocation  Referred By: Dr. Vinod Crystal    Subjective    The patient came in today with minimal complaints. He did say he was having some pain in his L shoulder. He reported that he will be helping his parents pack up clothing to move this week.     Precautions   No weight over 2lbs   Pain  Pain Assessment  Pain Assessment: 0-10  0-10 (Numeric) Pain Score: 2  Pain Location: Elbow  Pain Orientation: Left    Objective   Treatments:  Therapeutic Exercise  Therapeutic Exercise Performed: Yes  Therapeutic Exercise Activity 1: Arm bike 2'/2'  Therapeutic Exercise Activity 2: Supine elbow extension with wand 90\"  Therapeutic Exercise Activity 3: Weighted elbow extension 3 lbs 2'  Therapeutic Exercise Activity 4: Wand Shoulder abduction 1x10 5 sec hold  Therapeutic Exercise Activity 5: Wand shoulder ER 1x10 5 sec hold  Therapeutic Exercise Activity 6: Supine elbow flexion 30\"x3  Therapeutic " "Exercise Activity 7: Supine punches 2 lbs 3x10  Therapeutic Exercise Activity 8: Theraband rows GTB 2x12  Therapeutic Exercise Activity 9: Theraband extension GTB 3x10  Therapeutic Exercise Activity 10: Theraband triceps PD GTB  2x10  Therapeutic Exercise Activity 11: Table elbow extension stretch 30\"x3    Manual Therapy  Manual Therapy Performed: Yes  Manual Therapy Activity 1: PROM left elbow flexion and extension with end range hold per patient tolerance. Grade II-III radial head and ulnar glides for mobility.  "

## 2024-10-21 ENCOUNTER — HOSPITAL ENCOUNTER (OUTPATIENT)
Dept: RADIOLOGY | Facility: CLINIC | Age: 54
Discharge: HOME | End: 2024-10-21
Payer: COMMERCIAL

## 2024-10-21 ENCOUNTER — OFFICE VISIT (OUTPATIENT)
Dept: ORTHOPEDIC SURGERY | Facility: CLINIC | Age: 54
End: 2024-10-21
Payer: COMMERCIAL

## 2024-10-21 DIAGNOSIS — S52.042A DISPLACED FRACTURE OF CORONOID PROCESS OF LEFT ULNA, INITIAL ENCOUNTER FOR CLOSED FRACTURE: ICD-10-CM

## 2024-10-21 PROCEDURE — 99024 POSTOP FOLLOW-UP VISIT: CPT | Performed by: ORTHOPAEDIC SURGERY

## 2024-10-21 PROCEDURE — 99213 OFFICE O/P EST LOW 20 MIN: CPT | Performed by: ORTHOPAEDIC SURGERY

## 2024-10-21 PROCEDURE — 1036F TOBACCO NON-USER: CPT | Performed by: ORTHOPAEDIC SURGERY

## 2024-10-21 PROCEDURE — 73070 X-RAY EXAM OF ELBOW: CPT | Mod: LT

## 2024-10-21 NOTE — PROGRESS NOTES
History: Ty is here for his left elbow.  He is 11 weeks out from an elbow dislocation and coronoid fracture.  He has been going to therapy.  He still feels the elbow is a bit tight and gets some occasional soreness.  Overall is doing well.    Past medical history: Multiple  Medications: Multiple  Allergies: No known drug allergies    Please refer to the intake H&P regarding the patient's review of systems, family history and social history as was done today    HEENT: Normal  Lungs: Clear to auscultation  Heart: RRR  Abdomen: Soft, nontender  Skin: clear  Extremity: His skin is clear throughout.  Elbow range of motion today is from 8 to 120 degrees.  He supinates and pronates well.  He is able to make a full fist.  Decent  strength.  He denies any numbness or tingling.  Contralateral exam is normal for strength, motion, stability and neurovascular assessment.    Radiographs: 2 view left elbow x-rays show stable alignment to the elbow joint with interval healing to a small coronoid fracture.  There is some heterotopic ossification present anteriorly.    Assessment: Healing left elbow dislocation with small coronoid fracture, 11 weeks out    Plan: He is overall doing well.  He is going to continue to maximize his motion in therapy.  He can also progress with some gentle strengthening as tolerated.  We will see him back in 6 to 8 weeks to assess progress with 2 view left elbow x-rays.  If doing well at that time he can be released to full activity.  All questions were answered today with the patient.    For complete plan and/or surgical details, please refer to Dr. Crystal's portion of this split/shared dictation.     Marguerite Simon PA-C    In a face-to-face encounter today, I Vinod Crystal MD performed a history and physical examination, discussed pertinent diagnostic studies if indicated, and discussed diagnosis and management strategies with both the patient and the midlevel provider.  I reviewed  the midlevel's note and agree with the documented findings and plan of care.  Greater than 50% of the evaluation and treatment decision was performed by the physician myself during today's visit.    Ty continues to progress.  He is getting to about 8 degrees of extension and functionally he is getting good flexion as well.  He still lacks a bit of strength.  He wants to continue with physical therapy and can slowly increase weight and resistance as tolerated.  If doing well over the next 6 weeks he can see us back as needed.  He denies need for any restrictions.      This note was generated with voice recognition software and may contain grammatical errors.

## 2024-10-23 ENCOUNTER — APPOINTMENT (OUTPATIENT)
Dept: PHYSICAL THERAPY | Facility: CLINIC | Age: 54
End: 2024-10-23
Payer: COMMERCIAL

## 2024-10-23 DIAGNOSIS — M25.622 ELBOW STIFFNESS, LEFT: ICD-10-CM

## 2024-10-23 DIAGNOSIS — S53.105D DISLOCATION OF LEFT ELBOW, SUBSEQUENT ENCOUNTER: Primary | ICD-10-CM

## 2024-10-23 PROCEDURE — 97140 MANUAL THERAPY 1/> REGIONS: CPT | Performed by: PHYSICAL THERAPIST

## 2024-10-23 PROCEDURE — 97110 THERAPEUTIC EXERCISES: CPT | Performed by: PHYSICAL THERAPIST

## 2024-10-23 ASSESSMENT — PAIN SCALES - GENERAL: PAINLEVEL_OUTOF10: 2

## 2024-10-23 NOTE — PROGRESS NOTES
"Physical Therapy    Physical Therapy Treatment    Patient Name: Ty Piedra  MRN: [id number]  Today's Date: 10/23/2024    Current Problem  1. Dislocation of left elbow, subsequent encounter        2. Elbow stiffness, left          General  Reason for Referral: S/p L elbow fracture and dislocation  Referred By: Dr. Vinod Crystal    Insurance: [employer]  Allowed visits: BMN  Visit number: 8    Subjective  Patient with some improvement in his left elbow. He did have a F/U with Dr. Crystal and was cleared though was told he may not gain full extension ROM; he was also told that he is doing better than expected. He has been compliant with his HEP. He has been using his arm more throughout his day. He is able to \"Q-tip\" his ears with increased ease. He does notice difficulty lifting a fishing naida while fishing.     Objective  Reviewed and progressed marked therapeutic exercise per patient tolerance (09051 - 34 minutes, 2 units) UBE 2'/2'  Supine elbow extension with wand 90\"  Weighted elbow extension 4 lbs 2'  Supine elbow flexion 30\"x3  Supine punches 3 lbs 2x10  *Scaption 2 lbs 2x10  Theraband rows BuTB 2x10  Theraband extension BuTB 2x10  Theraband triceps PD BuTB 2x10  Table elbow extension stretch 30\"x3     MT (29424 - 11 minutes, 1 unit) PROM left elbow flexion and extension with end range hold per patient tolerance. Grade II-III radial head and ulnar glides for mobility.     Assessment  Patient tolerated workout well with some improvement in flexion ROM evident. Also did well with resistance progression. Encouraged continued compliance with HEP.     Plan  Continue per POC at this time. Consider resisted biceps curls next visit.     "

## 2024-10-30 ENCOUNTER — APPOINTMENT (OUTPATIENT)
Dept: PHYSICAL THERAPY | Facility: CLINIC | Age: 54
End: 2024-10-30
Payer: COMMERCIAL

## 2024-10-30 DIAGNOSIS — S53.105D DISLOCATION OF LEFT ELBOW, SUBSEQUENT ENCOUNTER: Primary | ICD-10-CM

## 2024-10-30 DIAGNOSIS — M25.622 ELBOW STIFFNESS, LEFT: ICD-10-CM

## 2024-10-30 PROCEDURE — 97110 THERAPEUTIC EXERCISES: CPT | Performed by: PHYSICAL THERAPIST

## 2024-10-30 PROCEDURE — 97140 MANUAL THERAPY 1/> REGIONS: CPT | Performed by: PHYSICAL THERAPIST

## 2024-10-30 ASSESSMENT — PAIN SCALES - GENERAL: PAINLEVEL_OUTOF10: 4

## 2024-11-05 NOTE — PROGRESS NOTES
"Physical Therapy    Physical Therapy Re-Evaluation    Patient Name: yT Piedra  MRN: [id number]  Today's Date: 11/6/2024    Current Problem  1. Dislocation of left elbow, subsequent encounter        2. Elbow stiffness, left          General  Reason for Referral: S/p L elbow fracture and dislocation  Referred By: Dr. Vinod Crystal    Insurance: [employer]  Allowed visits: BMN  Visit number: 10    Subjective  Patient with continued pain in his left elbow. His chief complaint is \"the range\" referring to ability to reach to his face. He does not feel that this has improved. He also notes inflammation around his elbow. Biggest limitation is \"anything up here\" referring to reaching to his head and face. He is able to Q-tip his ear. He is not abiding by his weight lifting restriction. He is not happy with status of his left elbow but also notes \"I've never broken anything\".     Objective  Worst pain in the last 24 hours, 4 increased from 3/10     Precautions: no weight > 2 lbs     Observation: some atrophy of left forearm musculature evident; posture WFL     AROM  Left elbow flexion: 131, decreased from 134 degrees  Left elbow extension: -13, improved from -17 degrees   Left elbow supination: 52, decreased from 62 degrees  Left elbow pronation: 62 p!, decreased from 78 degrees   Left wrist extension: 57, improved from 44 degrees   Left wrist flexion: 59, decreased from 65 degrees  Improvement with active shoulder abduction compared to last assessment  Left HBB: L2, improved from L3 P!      MMT  Left biceps: 4+  Left brachioradialis: 5, improved from 4  Left triceps: 4  Left shoulder abduction: 4+ P!, decreased from 5  Left  level 2: TBA     Mobility: normal mobility of radial ulnar joint present both proximally and distally.      Quick DASH: 18%, improved from 48%     Re-evaluation performed on this date with new objective measurements obtained as above. See updated goals below.    Reviewed and progressed " "marked therapeutic exercise per patient tolerance (02212 - 33 minutes, 2 units) Supine elbow extension with wand 90\"  Weighted elbow extension 4 lbs 2'  Supine elbow flexion 30\"x3  Supine punches 4 lbs 2x12  Scaption 2 lbs 2x10  Supination/pronation stretch 30\"x3 ea  Theraband rows BkTB 2x10  Theraband extension BkTB 2x10  Theraband triceps PD BkTB 2x10  Theraband biceps curls BkTB 2x10  Table elbow extension stretch 30\"x3     *added to HEP     MT (39655 - 12 minutes, 1 unit) PROM left elbow flexion and extension with end range hold per patient tolerance. Grade II-III radial head and ulnar glides for mobility.     Assessment  Patient with minimal improvement in AROM and strength at this time. He is well-versed in a HEP and understands importance of continued compliance with this to maintain therapeutic gains and continue to attempt to progress ROM. He would likely continue to benefit from skilled care for manual intervention and strength progression but is planning to be OOT for 2 weeks and would like to hold on additional PT management at this time.     Plan  No further visits planned at this time.     Goals  Independent with HEP to expedite progress and promote goal achievement - met   Decrease DASH to < or equal to 20% disabled for increased functional ability - met  Increase AROM left elbow to > or equal to 0 - 140 degrees, supination and pronation to > or equal to 80 dgrees, and wrist extension and extension to > or equal 60 degrees for ease with reaching to face and performing ADLs - somewhat worse  Decrease pain at worst to < or equal to 2/10 for improved QOL and ability to sleep through night - somewhat worse  Increase strength left biceps, brachioradialis, and triceps to 5/5 without pain for ease with lifting and stabilizing with left UE - partially met     "

## 2024-11-06 ENCOUNTER — APPOINTMENT (OUTPATIENT)
Dept: PHYSICAL THERAPY | Facility: CLINIC | Age: 54
End: 2024-11-06
Payer: COMMERCIAL

## 2024-11-06 DIAGNOSIS — S53.105D DISLOCATION OF LEFT ELBOW, SUBSEQUENT ENCOUNTER: Primary | ICD-10-CM

## 2024-11-06 DIAGNOSIS — M25.622 ELBOW STIFFNESS, LEFT: ICD-10-CM

## 2024-11-06 PROCEDURE — 97110 THERAPEUTIC EXERCISES: CPT | Performed by: PHYSICAL THERAPIST

## 2024-11-06 PROCEDURE — 97140 MANUAL THERAPY 1/> REGIONS: CPT | Performed by: PHYSICAL THERAPIST

## 2024-11-06 ASSESSMENT — PAIN SCALES - GENERAL: PAINLEVEL_OUTOF10: 3

## 2024-12-11 ENCOUNTER — APPOINTMENT (OUTPATIENT)
Dept: SURGERY | Facility: CLINIC | Age: 54
End: 2024-12-11
Payer: COMMERCIAL

## 2024-12-11 VITALS
SYSTOLIC BLOOD PRESSURE: 187 MMHG | BODY MASS INDEX: 31.37 KG/M2 | DIASTOLIC BLOOD PRESSURE: 124 MMHG | WEIGHT: 207 LBS | HEIGHT: 68 IN

## 2024-12-11 DIAGNOSIS — K42.9 UMBILICAL HERNIA WITHOUT OBSTRUCTION AND WITHOUT GANGRENE: Primary | ICD-10-CM

## 2024-12-11 PROCEDURE — 3008F BODY MASS INDEX DOCD: CPT | Performed by: SURGERY

## 2024-12-11 PROCEDURE — 99203 OFFICE O/P NEW LOW 30 MIN: CPT | Performed by: SURGERY

## 2024-12-11 NOTE — PROGRESS NOTES
Subjective   Patient ID: Ty Piedra is a 54 y.o. male who presents for New Patient Visit and Hernia.  HPI  54-year-old male noted an umbilical protrusion with no pain no skin changes no obstructive symptoms  Review of Systems   All other systems reviewed and are negative.      Objective   Physical Exam  Abdominal:      Comments: 1.5 cm x 1.5 cm reducible nontender umbilical hernia       Physical Exam  Constitutional:       Appearance: Normal appearance.   HENT:      Head: Normocephalic and atraumatic.      Mouth/Throat:      Pharynx: Oropharynx is clear.   Eyes:      Pupils: Pupils are equal, round, and reactive to light.   Cardiovascular:      Rate and Rhythm: Normal rate and regular rhythm.   Pulmonary:      Effort: Pulmonary effort is normal.      Breath sounds: Normal breath sounds.   Abdominal:      General: Abdomen is flat. Bowel sounds are normal.      Palpations: Abdomen is soft.   Musculoskeletal:         General: Normal range of motion.      Cervical back: Normal range of motion.   Skin:     General: Skin is warm.   Neurological:      General: No focal deficit present.      Mental Status: She is alert. Mental status is at baseline.   Psychiatric:         Mood and Affect: Mood normal.     Assessment/Plan   Asymptomatic umbilical hernia discussed with patient no indications for surgery at this time follow-up as needed for any changes of pain or enlargement or other symptoms patient agreeable with plan         James Bran MD 12/11/24 2:23 PM

## 2025-05-06 ENCOUNTER — APPOINTMENT (OUTPATIENT)
Dept: PRIMARY CARE | Facility: CLINIC | Age: 55
End: 2025-05-06
Payer: COMMERCIAL

## 2025-05-06 VITALS
HEART RATE: 82 BPM | DIASTOLIC BLOOD PRESSURE: 84 MMHG | OXYGEN SATURATION: 99 % | WEIGHT: 207 LBS | HEIGHT: 68 IN | BODY MASS INDEX: 31.37 KG/M2 | TEMPERATURE: 97.5 F | SYSTOLIC BLOOD PRESSURE: 155 MMHG

## 2025-05-06 DIAGNOSIS — Z23 NEED FOR VACCINATION: ICD-10-CM

## 2025-05-06 DIAGNOSIS — Z00.00 WELL ADULT EXAM: Primary | ICD-10-CM

## 2025-05-06 DIAGNOSIS — Z71.6 TOBACCO ABUSE COUNSELING: ICD-10-CM

## 2025-05-06 DIAGNOSIS — I10 ESSENTIAL (PRIMARY) HYPERTENSION: ICD-10-CM

## 2025-05-06 DIAGNOSIS — E55.9 VITAMIN D DEFICIENCY: ICD-10-CM

## 2025-05-06 DIAGNOSIS — Z12.5 SCREENING FOR PROSTATE CANCER: ICD-10-CM

## 2025-05-06 DIAGNOSIS — Z12.11 COLON CANCER SCREENING: ICD-10-CM

## 2025-05-06 DIAGNOSIS — E66.09 OBESITY DUE TO EXCESS CALORIES, UNSPECIFIED CLASS, UNSPECIFIED WHETHER SERIOUS COMORBIDITY PRESENT: ICD-10-CM

## 2025-05-06 PROCEDURE — 3008F BODY MASS INDEX DOCD: CPT | Performed by: INTERNAL MEDICINE

## 2025-05-06 PROCEDURE — 3079F DIAST BP 80-89 MM HG: CPT | Performed by: INTERNAL MEDICINE

## 2025-05-06 PROCEDURE — 90471 IMMUNIZATION ADMIN: CPT | Performed by: INTERNAL MEDICINE

## 2025-05-06 PROCEDURE — 1036F TOBACCO NON-USER: CPT | Performed by: INTERNAL MEDICINE

## 2025-05-06 PROCEDURE — 90715 TDAP VACCINE 7 YRS/> IM: CPT | Performed by: INTERNAL MEDICINE

## 2025-05-06 PROCEDURE — 3077F SYST BP >= 140 MM HG: CPT | Performed by: INTERNAL MEDICINE

## 2025-05-06 PROCEDURE — 93000 ELECTROCARDIOGRAM COMPLETE: CPT | Performed by: INTERNAL MEDICINE

## 2025-05-06 PROCEDURE — 99386 PREV VISIT NEW AGE 40-64: CPT | Performed by: INTERNAL MEDICINE

## 2025-05-06 RX ORDER — LISINOPRIL 10 MG/1
10 TABLET ORAL DAILY
Qty: 100 TABLET | Refills: 3 | Status: SHIPPED | OUTPATIENT
Start: 2025-05-06 | End: 2026-06-10

## 2025-05-06 ASSESSMENT — PATIENT HEALTH QUESTIONNAIRE - PHQ9
SUM OF ALL RESPONSES TO PHQ9 QUESTIONS 1 AND 2: 0
1. LITTLE INTEREST OR PLEASURE IN DOING THINGS: NOT AT ALL
2. FEELING DOWN, DEPRESSED OR HOPELESS: NOT AT ALL

## 2025-05-06 NOTE — PROGRESS NOTES
"  Over the past 2 weeks, how often have you been bothered by any of the following problems?     Unable to screen due to: --   Little interest or pleasure in doing things Not at all   Feeling down, depressed, or hopeless Not at all   Patient Health Questionnaire-2 Score 0   Subjective     Sleeping  ok    No elimination concerns  No cp   No sob  Not depressed  Home bp 160   No caffeine  Occ chews discussed      .    Current Issues:  Current concerns include bp   Retired sales  Not as much exercise as he should  .  Sleep: all night-ok  No bowel or bladder issues  No cp or sob or depression  Concern about wt    Review of Nutrition:  Current diet: discussed   Exercise discussed very active      Gen:  no fever  HEENT:  no trouble swallowing  CV:  no dyspnea, cyanosis  Lungs:  no shortness of breath  GI:  no constipation, no blood in stool  Vascular:  no edema  Neuro:   no weakness  Skin:  no rash  MS:no joint swelling  Gu:  no urinary complaints  All other systems have been reviewed and are negative for complaint      Depression Screen  (Note: if answer to either of the following is \"Yes\", then a more complete depression screening is indicated)   Q1: Over the past two weeks, have you felt down, depressed or hopeless? No  Q2: Over the past two weeks, have you felt little interest or pleasure in doing things? No    Screening Questions:  Objective   /84   Pulse 82   Temp 36.4 °C (97.5 °F)   Ht 1.727 m (5' 8\")   Wt 93.9 kg (207 lb)   SpO2 99%   BMI 31.47 kg/m²       General:   alert and oriented, in no acute distress   Gait:   normal   Skin:   normal   Oral cavity:   lips, mucosa, low uvula and tongue normal; teeth and gums normal   Eyes:   sclerae white, pupils equal and reactive   Ears:   normal bilaterally Tms grey   Neck:   no adenopathy and thyroid not enlarged, symmetric, no tenderness/mass/nodules   Lungs:  clear to auscultation bilaterally   Heart:   regular rate and rhythm, S1, S2 normal, no murmur, " click, rub or gallop   Abdomen:  soft, non-tender; bowel sounds normal; no masses, no organomegaly obese    : ne       Extremities:  extremities normal, warm and well-perfused; no cyanosis, clubbing, or edema,   Neuro:  normal without focal findings and muscle tone and strength normal and symmetric       Ty was seen today for annual exam.  Diagnoses and all orders for this visit:  Well adult exam (Primary)  -     Hemoglobin A1C; Future  -     Comprehensive Metabolic Panel; Future  -     TSH with reflex to Free T4 if abnormal; Future  -     Vitamin B12; Future  -     CBC and Auto Differential; Future  -     Lipid Panel; Future  -     ECG 12 Lead  -     Urinalysis with Reflex Microscopic; Future  -     Hemoglobin A1C  -     Comprehensive Metabolic Panel  -     TSH with reflex to Free T4 if abnormal  -     Vitamin B12  -     CBC and Auto Differential  -     Lipid Panel  -     Urinalysis with Reflex Microscopic  Vitamin D deficiency  -     Hemoglobin A1C; Future  -     Comprehensive Metabolic Panel; Future  -     TSH with reflex to Free T4 if abnormal; Future  -     Vitamin D 25-Hydroxy,Total (for eval of Vitamin D levels); Future  -     Vitamin B12; Future  -     CBC and Auto Differential; Future  -     Lipid Panel; Future  -     ECG 12 Lead  -     Urinalysis with Reflex Microscopic; Future  -     Hemoglobin A1C  -     Comprehensive Metabolic Panel  -     TSH with reflex to Free T4 if abnormal  -     Vitamin D 25-Hydroxy,Total (for eval of Vitamin D levels)  -     Vitamin B12  -     CBC and Auto Differential  -     Lipid Panel  -     Urinalysis with Reflex Microscopic  Screening for prostate cancer  -     Hemoglobin A1C; Future  -     Comprehensive Metabolic Panel; Future  -     TSH with reflex to Free T4 if abnormal; Future  -     Vitamin B12; Future  -     CBC and Auto Differential; Future  -     Prostate Specific Antigen, Screen; Future  -     Lipid Panel; Future  -     ECG 12 Lead  -     Urinalysis with  Reflex Microscopic; Future  -     Hemoglobin A1C  -     Comprehensive Metabolic Panel  -     TSH with reflex to Free T4 if abnormal  -     Vitamin B12  -     CBC and Auto Differential  -     Prostate Specific Antigen, Screen  -     Lipid Panel  -     Urinalysis with Reflex Microscopic  Obesity due to excess calories, unspecified class, unspecified whether serious comorbidity present  -     Hemoglobin A1C; Future  -     Comprehensive Metabolic Panel; Future  -     TSH with reflex to Free T4 if abnormal; Future  -     Vitamin B12; Future  -     CBC and Auto Differential; Future  -     Lipid Panel; Future  -     ECG 12 Lead  -     Urinalysis with Reflex Microscopic; Future  -     Hemoglobin A1C  -     Comprehensive Metabolic Panel  -     TSH with reflex to Free T4 if abnormal  -     Vitamin B12  -     CBC and Auto Differential  -     Lipid Panel  -     Urinalysis with Reflex Microscopic  Need for vaccination  -     Tdap vaccine, age 7 years and older  Colon cancer screening  -     Cologuard® colon cancer screening; Future  -     Cologuard® colon cancer screening  Essential (primary) hypertension  -     lisinopril 10 mg tablet; Take 1 tablet (10 mg) by mouth once daily.  Tobacco abuse counseling       Vaccines per orders    The risks and benefits and side effects of medication were reviewed with the patient.    Follow up in 3 months or prn.   Snores, no apnea per pt low lying uvula  Will speak w wife, I would order sleep study    Stop chew discussed

## 2025-05-07 DIAGNOSIS — E55.9 VITAMIN D DEFICIENCY: ICD-10-CM

## 2025-05-07 DIAGNOSIS — I10 ESSENTIAL (PRIMARY) HYPERTENSION: ICD-10-CM

## 2025-05-07 DIAGNOSIS — E78.00 ELEVATED CHOLESTEROL: ICD-10-CM

## 2025-05-07 DIAGNOSIS — Z13.220 LIPID SCREENING: ICD-10-CM

## 2025-05-07 DIAGNOSIS — E66.09 OBESITY DUE TO EXCESS CALORIES, UNSPECIFIED CLASS, UNSPECIFIED WHETHER SERIOUS COMORBIDITY PRESENT: ICD-10-CM

## 2025-05-07 LAB
25(OH)D3+25(OH)D2 SERPL-MCNC: 26 NG/ML (ref 30–100)
ALBUMIN SERPL-MCNC: 4.4 G/DL (ref 3.6–5.1)
ALP SERPL-CCNC: 98 U/L (ref 35–144)
ALT SERPL-CCNC: 34 U/L (ref 9–46)
ANION GAP SERPL CALCULATED.4IONS-SCNC: 8 MMOL/L (CALC) (ref 7–17)
APPEARANCE UR: CLEAR
AST SERPL-CCNC: 20 U/L (ref 10–35)
BASOPHILS # BLD AUTO: 112 CELLS/UL (ref 0–200)
BASOPHILS NFR BLD AUTO: 1.8 %
BILIRUB SERPL-MCNC: 0.4 MG/DL (ref 0.2–1.2)
BILIRUB UR QL STRIP: NEGATIVE
BUN SERPL-MCNC: 18 MG/DL (ref 7–25)
CALCIUM SERPL-MCNC: 9.4 MG/DL (ref 8.6–10.3)
CHLORIDE SERPL-SCNC: 104 MMOL/L (ref 98–110)
CHOLEST SERPL-MCNC: 202 MG/DL
CHOLEST/HDLC SERPL: 4.4 (CALC)
CO2 SERPL-SCNC: 28 MMOL/L (ref 20–32)
COLOR UR: YELLOW
CREAT SERPL-MCNC: 0.94 MG/DL (ref 0.7–1.3)
EGFRCR SERPLBLD CKD-EPI 2021: 95 ML/MIN/1.73M2
EOSINOPHIL # BLD AUTO: 347 CELLS/UL (ref 15–500)
EOSINOPHIL NFR BLD AUTO: 5.6 %
ERYTHROCYTE [DISTWIDTH] IN BLOOD BY AUTOMATED COUNT: 12.5 % (ref 11–15)
EST. AVERAGE GLUCOSE BLD GHB EST-MCNC: 126 MG/DL
EST. AVERAGE GLUCOSE BLD GHB EST-SCNC: 7 MMOL/L
GLUCOSE SERPL-MCNC: 85 MG/DL (ref 65–139)
GLUCOSE UR QL STRIP: NEGATIVE
HBA1C MFR BLD: 6 %
HCT VFR BLD AUTO: 46.5 % (ref 38.5–50)
HDLC SERPL-MCNC: 46 MG/DL
HGB BLD-MCNC: 15.3 G/DL (ref 13.2–17.1)
HGB UR QL STRIP: NEGATIVE
KETONES UR QL STRIP: NEGATIVE
LDLC SERPL CALC-MCNC: 114 MG/DL (CALC)
LEUKOCYTE ESTERASE UR QL STRIP: NEGATIVE
LYMPHOCYTES # BLD AUTO: 1959 CELLS/UL (ref 850–3900)
LYMPHOCYTES NFR BLD AUTO: 31.6 %
MCH RBC QN AUTO: 29 PG (ref 27–33)
MCHC RBC AUTO-ENTMCNC: 32.9 G/DL (ref 32–36)
MCV RBC AUTO: 88.2 FL (ref 80–100)
MONOCYTES # BLD AUTO: 539 CELLS/UL (ref 200–950)
MONOCYTES NFR BLD AUTO: 8.7 %
NEUTROPHILS # BLD AUTO: 3243 CELLS/UL (ref 1500–7800)
NEUTROPHILS NFR BLD AUTO: 52.3 %
NITRITE UR QL STRIP: NEGATIVE
NONHDLC SERPL-MCNC: 156 MG/DL (CALC)
PH UR STRIP: <5 [PH] (ref 5–8)
PLATELET # BLD AUTO: 351 THOUSAND/UL (ref 140–400)
PMV BLD REES-ECKER: 8.7 FL (ref 7.5–12.5)
POTASSIUM SERPL-SCNC: 4.7 MMOL/L (ref 3.5–5.3)
PROT SERPL-MCNC: 6.9 G/DL (ref 6.1–8.1)
PROT UR QL STRIP: NEGATIVE
PSA SERPL-MCNC: 1.2 NG/ML
RBC # BLD AUTO: 5.27 MILLION/UL (ref 4.2–5.8)
SODIUM SERPL-SCNC: 140 MMOL/L (ref 135–146)
SP GR UR STRIP: 1.02 (ref 1–1.03)
TRIGL SERPL-MCNC: 316 MG/DL
TSH SERPL-ACNC: 1.17 MIU/L (ref 0.4–4.5)
VIT B12 SERPL-MCNC: 384 PG/ML (ref 200–1100)
WBC # BLD AUTO: 6.2 THOUSAND/UL (ref 3.8–10.8)

## 2025-05-24 LAB — NONINV COLON CA DNA+OCC BLD SCRN STL QL: NEGATIVE

## 2025-08-13 ENCOUNTER — APPOINTMENT (OUTPATIENT)
Dept: PRIMARY CARE | Facility: CLINIC | Age: 55
End: 2025-08-13
Payer: COMMERCIAL

## 2026-05-08 ENCOUNTER — APPOINTMENT (OUTPATIENT)
Dept: PRIMARY CARE | Facility: CLINIC | Age: 56
End: 2026-05-08
Payer: COMMERCIAL